# Patient Record
Sex: MALE | Race: WHITE | Employment: FULL TIME | URBAN - METROPOLITAN AREA
[De-identification: names, ages, dates, MRNs, and addresses within clinical notes are randomized per-mention and may not be internally consistent; named-entity substitution may affect disease eponyms.]

---

## 2022-07-25 ENCOUNTER — HOSPITAL ENCOUNTER (EMERGENCY)
Age: 52
Discharge: HOME | End: 2022-07-25
Attending: EMERGENCY MEDICINE

## 2022-07-25 VITALS
HEIGHT: 72 IN | RESPIRATION RATE: 18 BRPM | SYSTOLIC BLOOD PRESSURE: 139 MMHG | DIASTOLIC BLOOD PRESSURE: 93 MMHG | HEART RATE: 98 BPM | OXYGEN SATURATION: 100 % | WEIGHT: 190 LBS | TEMPERATURE: 98.3 F | BODY MASS INDEX: 25.73 KG/M2

## 2022-07-25 DIAGNOSIS — J01.90 ACUTE SINUSITIS, RECURRENCE NOT SPECIFIED, UNSPECIFIED LOCATION: Primary | ICD-10-CM

## 2022-07-25 PROCEDURE — 99283 EMERGENCY DEPT VISIT LOW MDM: CPT

## 2022-07-25 RX ORDER — AMOXICILLIN AND CLAVULANATE POTASSIUM 875; 125 MG/1; MG/1
1 TABLET, FILM COATED ORAL 2 TIMES DAILY
Qty: 14 TABLET | Refills: 0 | Status: SHIPPED | OUTPATIENT
Start: 2022-07-25 | End: 2022-08-01

## 2022-07-25 ASSESSMENT — PAIN - FUNCTIONAL ASSESSMENT: PAIN_FUNCTIONAL_ASSESSMENT: NONE - DENIES PAIN

## 2022-07-25 NOTE — DISCHARGE INSTRUCTIONS
Augmentin as directed. See the doctor to follow-up. Return for worsening pain, fever, difficulty breathing, or if worse in any way. Stop smoking. Please understand that at this time there is no evidence for a more serious underlying process, but that early in the process of an illness or injury, an emergency department workup can be falsely reassuring. You should contact your family doctor within the next 48 hours for a follow up appointment    Rodyechosylwia Aguero!!!    From [facility]) and [facility]    On behalf of the Emergency Department staff at [facility]), I would like to thank you for giving us the opportunity to address your health care needs and concerns. We hope that during your visit, our service was delivered in a professional and caring manner. Please keep [facility] ([facility]) in mind as we walk with you down the path to your own personal wellness. Please expect an automated text message or email from us so we can ask a few questions about your health and progress. Based on your answers, a clinician may call you back to offer help and instructions. Please understand that early in the process of an illness or injury, an emergency department workup can be falsely reassuring. If you notice any worsening, changing or persistent symptoms please call your family doctor or return to the ER immediately. Tell us how we did during your visit at [email or web address]   and let us know about your experience

## 2022-07-25 NOTE — ED NOTES
Patient ambulated to room. Patient states having a smelly mucus drainage out of the left nostril for a couple of months. Patient states Saturday and Sunday having left cheek facial swelling. Patient denies any SOB or fever.       Haroldo Bustamante LPN  96/15/61 [id number]

## 2022-07-25 NOTE — ED PROVIDER NOTES
[address]      Pt Name: Servando Bragg  MRN: [id number]  [place] 1970  Date of evaluation: 7/25/2022      CHIEF COMPLAINT       Chief Complaint   Patient presents with    Sinusitis     Pt. States having left side swollen cheek Saturday and Sunday. States having green smelly drainage out the left nose           HISTORY OF PRESENT ILLNESS      The patient presents with a swollen feeling in the left side of the cheek on Saturday and Sunday. It is now Monday. He says he has been blowing green smelly discharge out of the left naris. The patient denies foreign body. He denies use of cocaine in the nose. He is a smoker however. He denies fever. He denies dental pain. He did lose a tooth bruise on the right side and it was molar not near where he is having the symptoms. Patient denies facial pain per se. He has not had any difficulty breathing or swallowing. REVIEW OF SYSTEMS       All systems reviewed and negative unless noted in HPI. The patient denies fever or constitutional symptoms. Denies vision change. Yellow-green discharge from left naris. Puffiness of left side of face. Denies any neck pain or stiffness. Denies chest pain or shortness of breath. No nausea,  vomiting or diarrhea. Denies any dysuria. Denies urinary frequency or hematuria. Denies musculoskeletal injury or pain. Denies any weakness, numbness or focal neurologic deficit. Denies any skin rash or edema. No recent psychiatric issues. No easy bruising or bleeding. Denies any polyuria, polydypsia or history of immunocompromise. PAST MEDICAL HISTORY    has no past medical history on file. SURGICAL HISTORY      has no past surgical history on file. CURRENT MEDICATIONS       Previous Medications    No medications on file       ALLERGIES     has no allergies on file. FAMILY HISTORY     has no family status information on file.       family history is not on file. SOCIAL HISTORY      reports that he has been smoking cigarettes. He has never used smokeless tobacco. He reports current alcohol use of about 6.0 standard drinks per week. He reports current drug use. Drug: Marijuana Adonis Maritzakim). PHYSICAL EXAM     INITIAL VITALS:  height is 6' (1.829 m) and weight is 86.2 kg (190 lb). His oral temperature is 98.3 °F (36.8 °C). His blood pressure is 139/93 (abnormal) and his pulse is 98. His respiration is 18 and oxygen saturation is 100%. The patient is alert and oriented, in no apparent distress. HEENT is atraumatic. Pupils are PERRL at 4 mm with normal extraocular motion. Mucous membranes moist.  Inside of nose does not appear obstructed or have abnormal appearance. No purulent drainage. No tenderness with percussion over the frontal or maxillary sinuses. No facial swelling or redness appreciated. EMs negative bilaterally. Neck is supple with no lymphadenopathy. No JVD. No meningismus. Heart sounds regular rate and rhythm with no gallops, murmurs, or rubs. Lungs clear, no wheezes, rales or rhonchi. Abdomen: soft, nontender with no pain to palpation. No pulsatile mass. Normal bowel sounds are noted. No rebound or guarding. Musculoskeletal exam shows no evidence of trauma. Normal distal pulses in all extremities. Skin: no rash or edema. Neurological exam reveals cranial nerves 2 through 12 grossly intact. Patient has equal  and normal deep tendon reflexes. Psychiatric: no hallucinations or suicidal ideation. Lymphatics.:  No lymphadenopathy.        DIFFERENTIAL DIAGNOSIS/ MDM:     Sinusitis, cellulitis of the face, dental infection, otitis, otitis media    DIAGNOSTIC RESULTS         EMERGENCY DEPARTMENT COURSE:   Vitals:    Vitals:    07/25/22 1402   BP: (!) 139/93   Pulse: 98   Resp: 18   Temp: 98.3 °F (36.8 °C)   TempSrc: Oral   SpO2: 100%   Weight: 86.2 kg (190 lb)   Height: 6' (1.829 m)